# Patient Record
Sex: MALE | Race: WHITE | Employment: OTHER | URBAN - METROPOLITAN AREA
[De-identification: names, ages, dates, MRNs, and addresses within clinical notes are randomized per-mention and may not be internally consistent; named-entity substitution may affect disease eponyms.]

---

## 2017-01-30 ENCOUNTER — TELEPHONE (OUTPATIENT)
Dept: CARDIOTHORACIC SURGERY | Age: 72
End: 2017-01-30

## 2017-01-30 DIAGNOSIS — Z87.09 HX OF PNEUMOTHORAX: ICD-10-CM

## 2017-01-30 DIAGNOSIS — R07.89 OTHER CHEST PAIN: Primary | ICD-10-CM

## 2017-12-21 ENCOUNTER — HOSPITAL ENCOUNTER (OUTPATIENT)
Dept: CARDIAC REHAB | Age: 72
Discharge: HOME | End: 2017-12-31
Attending: FAMILY MEDICINE | Admitting: INTERNAL MEDICINE

## 2018-01-01 ENCOUNTER — HOSPITAL ENCOUNTER (OUTPATIENT)
Dept: OTHER | Age: 73
Discharge: HOME | End: 2018-01-31
Attending: INTERNAL MEDICINE | Admitting: INTERNAL MEDICINE

## 2018-02-01 ENCOUNTER — HOSPITAL ENCOUNTER (OUTPATIENT)
Dept: OTHER | Age: 73
Discharge: HOME | End: 2018-02-28
Attending: INTERNAL MEDICINE | Admitting: INTERNAL MEDICINE

## 2018-03-01 ENCOUNTER — HOSPITAL ENCOUNTER (OUTPATIENT)
Dept: OTHER | Age: 73
Discharge: HOME | End: 2018-03-31
Attending: INTERNAL MEDICINE | Admitting: INTERNAL MEDICINE

## 2021-08-10 ENCOUNTER — APPOINTMENT (RX ONLY)
Dept: URBAN - METROPOLITAN AREA CLINIC 148 | Facility: CLINIC | Age: 76
Setting detail: DERMATOLOGY
End: 2021-08-10

## 2021-08-10 DIAGNOSIS — L82.1 OTHER SEBORRHEIC KERATOSIS: ICD-10-CM

## 2021-08-10 DIAGNOSIS — B07.8 OTHER VIRAL WARTS: ICD-10-CM

## 2021-08-10 DIAGNOSIS — L82.0 INFLAMED SEBORRHEIC KERATOSIS: ICD-10-CM

## 2021-08-10 DIAGNOSIS — D22 MELANOCYTIC NEVI: ICD-10-CM

## 2021-08-10 DIAGNOSIS — L81.4 OTHER MELANIN HYPERPIGMENTATION: ICD-10-CM

## 2021-08-10 DIAGNOSIS — D18.0 HEMANGIOMA: ICD-10-CM

## 2021-08-10 DIAGNOSIS — L91.8 OTHER HYPERTROPHIC DISORDERS OF THE SKIN: ICD-10-CM

## 2021-08-10 DIAGNOSIS — L57.0 ACTINIC KERATOSIS: ICD-10-CM

## 2021-08-10 PROBLEM — D48.5 NEOPLASM OF UNCERTAIN BEHAVIOR OF SKIN: Status: ACTIVE | Noted: 2021-08-10

## 2021-08-10 PROBLEM — D18.01 HEMANGIOMA OF SKIN AND SUBCUTANEOUS TISSUE: Status: ACTIVE | Noted: 2021-08-10

## 2021-08-10 PROCEDURE — ? COUNSELING

## 2021-08-10 PROCEDURE — 17004 DESTROY PREMAL LESIONS 15/>: CPT

## 2021-08-10 PROCEDURE — ? MEDICARE ABN

## 2021-08-10 PROCEDURE — ? ADDITIONAL NOTES

## 2021-08-10 PROCEDURE — ? LIQUID NITROGEN

## 2021-08-10 PROCEDURE — 17110 DESTRUCTION B9 LES UP TO 14: CPT | Mod: 59

## 2021-08-10 PROCEDURE — 11103 TANGNTL BX SKIN EA SEP/ADDL: CPT | Mod: 59

## 2021-08-10 PROCEDURE — ? BIOPSY BY SHAVE METHOD

## 2021-08-10 PROCEDURE — 99203 OFFICE O/P NEW LOW 30 MIN: CPT | Mod: 25

## 2021-08-10 PROCEDURE — 69100 BIOPSY OF EXTERNAL EAR: CPT

## 2021-08-10 PROCEDURE — 11102 TANGNTL BX SKIN SINGLE LES: CPT | Mod: 59

## 2021-08-10 PROCEDURE — ? SUNSCREEN RECOMMENDATIONS

## 2021-08-10 ASSESSMENT — LOCATION SIMPLE DESCRIPTION DERM
LOCATION SIMPLE: NECK
LOCATION SIMPLE: RIGHT CHEEK
LOCATION SIMPLE: RIGHT UPPER ARM
LOCATION SIMPLE: LEFT UPPER ARM
LOCATION SIMPLE: LEFT EAR
LOCATION SIMPLE: LEFT PRETIBIAL REGION
LOCATION SIMPLE: UPPER BACK
LOCATION SIMPLE: RIGHT LOWER BACK
LOCATION SIMPLE: LEFT ANTERIOR NECK
LOCATION SIMPLE: LEFT CHEEK
LOCATION SIMPLE: RIGHT EAR
LOCATION SIMPLE: LEFT CLAVICULAR SKIN
LOCATION SIMPLE: ABDOMEN
LOCATION SIMPLE: RIGHT UPPER BACK

## 2021-08-10 ASSESSMENT — LOCATION DETAILED DESCRIPTION DERM
LOCATION DETAILED: RIGHT SUPERIOR LATERAL UPPER BACK
LOCATION DETAILED: RIGHT SCAPHA
LOCATION DETAILED: LEFT SCAPHA
LOCATION DETAILED: LEFT CLAVICULAR SKIN
LOCATION DETAILED: RIGHT LATERAL MALAR CHEEK
LOCATION DETAILED: RIGHT DISTAL POSTERIOR UPPER ARM
LOCATION DETAILED: SUPERIOR THORACIC SPINE
LOCATION DETAILED: LEFT SUPERIOR LATERAL MALAR CHEEK
LOCATION DETAILED: RIGHT INFERIOR MEDIAL LOWER BACK
LOCATION DETAILED: RIGHT SUPERIOR LATERAL NECK
LOCATION DETAILED: LEFT PROXIMAL PRETIBIAL REGION
LOCATION DETAILED: LEFT SUPERIOR LATERAL NECK
LOCATION DETAILED: LEFT PROXIMAL POSTERIOR UPPER ARM
LOCATION DETAILED: EPIGASTRIC SKIN
LOCATION DETAILED: LEFT SUPERIOR ANTERIOR NECK
LOCATION DETAILED: LEFT CLAVICULAR NECK

## 2021-08-10 ASSESSMENT — LOCATION ZONE DERM
LOCATION ZONE: LEG
LOCATION ZONE: NECK
LOCATION ZONE: EAR
LOCATION ZONE: ARM
LOCATION ZONE: TRUNK
LOCATION ZONE: FACE

## 2021-08-10 NOTE — PROCEDURE: LIQUID NITROGEN
Render In Bullet Format When Appropriate: No
Total Number Of Aks Treated: 44
Detail Level: Zone
Consent: The patient's consent was obtained including but not limited to risks of crusting, scabbing, blistering, scarring, darker or lighter pigmentary change, recurrence, incomplete removal and infection.
Post-Care Instructions: I reviewed with the patient in detail post-care instructions. Patient is to wear sunprotection, and avoid picking at any of the treated lesions. Pt may apply Vaseline to crusted or scabbing areas.
Duration Of Freeze Thaw-Cycle (Seconds): 0
Medical Necessity Clause: This procedure was medically necessary because the lesions that were treated were:
Medical Necessity Information: It is in your best interest to select a reason for this procedure from the list below. All of these items fulfill various CMS LCD requirements except the new and changing color options.
Detail Level: Detailed
Show Topical Anesthesia Variable?: Yes
Medical Necessity Clause: This procedure was medically necessary because the lesions that were treated were: at risk for and/or subject to recurrent physical trauma as a result of lesion type and location with history of the same, bleeding and irritated.
Number Of Freeze-Thaw Cycles: 2 freeze-thaw cycles

## 2022-06-23 ENCOUNTER — APPOINTMENT (RX ONLY)
Dept: URBAN - METROPOLITAN AREA CLINIC 148 | Facility: CLINIC | Age: 77
Setting detail: DERMATOLOGY
End: 2022-06-23

## 2022-06-23 DIAGNOSIS — D22 MELANOCYTIC NEVI: ICD-10-CM

## 2022-06-23 DIAGNOSIS — L57.0 ACTINIC KERATOSIS: ICD-10-CM

## 2022-06-23 DIAGNOSIS — B07.8 OTHER VIRAL WARTS: ICD-10-CM

## 2022-06-23 DIAGNOSIS — L82.1 OTHER SEBORRHEIC KERATOSIS: ICD-10-CM

## 2022-06-23 DIAGNOSIS — D485 NEOPLASM OF UNCERTAIN BEHAVIOR OF SKIN: ICD-10-CM

## 2022-06-23 DIAGNOSIS — L81.4 OTHER MELANIN HYPERPIGMENTATION: ICD-10-CM

## 2022-06-23 DIAGNOSIS — L82.0 INFLAMED SEBORRHEIC KERATOSIS: ICD-10-CM

## 2022-06-23 DIAGNOSIS — D18.0 HEMANGIOMA: ICD-10-CM

## 2022-06-23 PROBLEM — D18.01 HEMANGIOMA OF SKIN AND SUBCUTANEOUS TISSUE: Status: ACTIVE | Noted: 2022-06-23

## 2022-06-23 PROBLEM — D48.5 NEOPLASM OF UNCERTAIN BEHAVIOR OF SKIN: Status: ACTIVE | Noted: 2022-06-23

## 2022-06-23 PROBLEM — D22.5 MELANOCYTIC NEVI OF TRUNK: Status: ACTIVE | Noted: 2022-06-23

## 2022-06-23 PROBLEM — C44.229 SQUAMOUS CELL CARCINOMA OF SKIN OF LEFT EAR AND EXTERNAL AURICULAR CANAL: Status: ACTIVE | Noted: 2022-06-23

## 2022-06-23 PROBLEM — D04.39 CARCINOMA IN SITU OF SKIN OF OTHER PARTS OF FACE: Status: ACTIVE | Noted: 2022-06-23

## 2022-06-23 PROCEDURE — 99213 OFFICE O/P EST LOW 20 MIN: CPT | Mod: 25

## 2022-06-23 PROCEDURE — ? COUNSELING

## 2022-06-23 PROCEDURE — ? BIOPSY BY SHAVE METHOD

## 2022-06-23 PROCEDURE — ? LIQUID NITROGEN

## 2022-06-23 PROCEDURE — 17110 DESTRUCTION B9 LES UP TO 14: CPT | Mod: 59

## 2022-06-23 PROCEDURE — 17004 DESTROY PREMAL LESIONS 15/>: CPT

## 2022-06-23 PROCEDURE — ? DEFER

## 2022-06-23 PROCEDURE — ? PATHOLOGY DISCUSSION

## 2022-06-23 PROCEDURE — 11103 TANGNTL BX SKIN EA SEP/ADDL: CPT | Mod: 59

## 2022-06-23 PROCEDURE — 11102 TANGNTL BX SKIN SINGLE LES: CPT | Mod: 59

## 2022-06-23 PROCEDURE — ? SUNSCREEN RECOMMENDATIONS

## 2022-06-23 ASSESSMENT — LOCATION DETAILED DESCRIPTION DERM
LOCATION DETAILED: RIGHT INFERIOR POSTAURICULAR SKIN
LOCATION DETAILED: LEFT SUPERIOR CENTRAL MALAR CHEEK
LOCATION DETAILED: RIGHT INFERIOR MEDIAL LOWER BACK
LOCATION DETAILED: LEFT INFERIOR POSTAURICULAR SKIN
LOCATION DETAILED: RIGHT ANTERIOR EARLOBE
LOCATION DETAILED: RIGHT RADIAL DORSAL HAND
LOCATION DETAILED: EPIGASTRIC SKIN
LOCATION DETAILED: LEFT INFERIOR CENTRAL MALAR CHEEK
LOCATION DETAILED: RIGHT INFERIOR LATERAL MALAR CHEEK
LOCATION DETAILED: RIGHT CENTRAL POSTAURICULAR SKIN
LOCATION DETAILED: RIGHT LATERAL MALAR CHEEK
LOCATION DETAILED: LEFT LATERAL FOREHEAD
LOCATION DETAILED: LEFT CENTRAL BUCCAL CHEEK
LOCATION DETAILED: SUPERIOR THORACIC SPINE
LOCATION DETAILED: LEFT SUPERIOR LATERAL BUCCAL CHEEK
LOCATION DETAILED: RIGHT INFERIOR HELIX
LOCATION DETAILED: LEFT PROXIMAL DORSAL FOREARM
LOCATION DETAILED: RIGHT ULNAR DORSAL HAND
LOCATION DETAILED: LEFT PROXIMAL POSTERIOR UPPER ARM
LOCATION DETAILED: RIGHT DISTAL POSTERIOR UPPER ARM
LOCATION DETAILED: LEFT POSTERIOR SHOULDER
LOCATION DETAILED: RIGHT CENTRAL MALAR CHEEK
LOCATION DETAILED: RIGHT CENTRAL TEMPLE
LOCATION DETAILED: LEFT CENTRAL POSTAURICULAR SKIN
LOCATION DETAILED: LEFT SUPERIOR FOREHEAD
LOCATION DETAILED: RIGHT SUPERIOR CENTRAL BUCCAL CHEEK
LOCATION DETAILED: LEFT SUPERIOR CENTRAL BUCCAL CHEEK
LOCATION DETAILED: RIGHT SUPERIOR HELIX
LOCATION DETAILED: RIGHT DISTAL DORSAL FOREARM
LOCATION DETAILED: LEFT SUPERIOR MEDIAL MALAR CHEEK
LOCATION DETAILED: LEFT SUPERIOR POSTAURICULAR SKIN
LOCATION DETAILED: MEDIAL FRONTAL SCALP
LOCATION DETAILED: RIGHT SUPERIOR LATERAL BUCCAL CHEEK
LOCATION DETAILED: RIGHT UPPER CUTANEOUS LIP
LOCATION DETAILED: RIGHT MID TEMPLE
LOCATION DETAILED: LEFT INFERIOR MEDIAL UPPER BACK
LOCATION DETAILED: RIGHT INFERIOR LATERAL FOREHEAD

## 2022-06-23 ASSESSMENT — LOCATION ZONE DERM
LOCATION ZONE: TRUNK
LOCATION ZONE: ARM
LOCATION ZONE: HAND
LOCATION ZONE: LIP
LOCATION ZONE: SCALP
LOCATION ZONE: FACE
LOCATION ZONE: EAR

## 2022-06-23 ASSESSMENT — LOCATION SIMPLE DESCRIPTION DERM
LOCATION SIMPLE: RIGHT UPPER ARM
LOCATION SIMPLE: LEFT FOREARM
LOCATION SIMPLE: LEFT UPPER ARM
LOCATION SIMPLE: RIGHT EAR
LOCATION SIMPLE: LEFT CHEEK
LOCATION SIMPLE: LEFT FOREHEAD
LOCATION SIMPLE: RIGHT FOREARM
LOCATION SIMPLE: RIGHT TEMPLE
LOCATION SIMPLE: ABDOMEN
LOCATION SIMPLE: RIGHT CHEEK
LOCATION SIMPLE: RIGHT FOREHEAD
LOCATION SIMPLE: SCALP
LOCATION SIMPLE: LEFT SHOULDER
LOCATION SIMPLE: LEFT UPPER BACK
LOCATION SIMPLE: RIGHT LOWER BACK
LOCATION SIMPLE: RIGHT LIP
LOCATION SIMPLE: FRONTAL SCALP
LOCATION SIMPLE: RIGHT HAND
LOCATION SIMPLE: UPPER BACK

## 2022-06-23 NOTE — PROCEDURE: DEFER
Scheduling Instructions (Optional): Dr. Hollis
Procedure To Be Performed At Next Visit: Mohs surgery
Detail Level: Detailed
Introduction Text (Please End With A Colon): The following procedure was deferred:
Scheduling Instructions (Optional): Dr. Hollis
Scheduling Instructions (Optional): excision

## 2022-06-23 NOTE — PROCEDURE: LIQUID NITROGEN
Duration Of Freeze Thaw-Cycle (Seconds): 3
Consent: The patient's consent was obtained including but not limited to risks of crusting, scabbing, blistering, scarring, darker or lighter pigmentary change, recurrence, incomplete removal and infection.
Post-Care Instructions: I reviewed with the patient in detail post-care instructions. Patient is to wear sunprotection, and avoid picking at any of the treated lesions. Pt may apply Vaseline to crusted or scabbing areas.
Number Of Freeze-Thaw Cycles: 2 freeze-thaw cycles
Show Aperture Variable?: Yes
Render Post-Care Instructions In Note?: no
Detail Level: Zone
Medical Necessity Clause: This procedure was medically necessary because the lesions that were treated were: at risk for and/or subject to recurrent physical trauma as a result of lesion type and location with history of the same, bleeding and irritated.
Spray Paint Text: The liquid nitrogen was applied to the skin utilizing a spray paint frosting technique.
Detail Level: Detailed
Medical Necessity Information: It is in your best interest to select a reason for this procedure from the list below. All of these items fulfill various CMS LCD requirements except the new and changing color options.

## 2022-06-28 ENCOUNTER — APPOINTMENT (RX ONLY)
Dept: URBAN - METROPOLITAN AREA CLINIC 148 | Facility: CLINIC | Age: 77
Setting detail: DERMATOLOGY
End: 2022-06-28

## 2022-06-28 DIAGNOSIS — D22 MELANOCYTIC NEVI: ICD-10-CM

## 2022-06-28 PROBLEM — D22.5 MELANOCYTIC NEVI OF TRUNK: Status: ACTIVE | Noted: 2022-06-28

## 2022-06-28 PROCEDURE — 11402 EXC TR-EXT B9+MARG 1.1-2 CM: CPT | Mod: 79

## 2022-06-28 PROCEDURE — ? EXCISION

## 2022-06-28 PROCEDURE — 13101 CMPLX RPR TRUNK 2.6-7.5 CM: CPT | Mod: 79

## 2022-06-28 ASSESSMENT — LOCATION ZONE DERM: LOCATION ZONE: TRUNK

## 2022-06-28 ASSESSMENT — LOCATION SIMPLE DESCRIPTION DERM: LOCATION SIMPLE: RIGHT LOWER BACK

## 2022-06-28 ASSESSMENT — LOCATION DETAILED DESCRIPTION DERM: LOCATION DETAILED: RIGHT INFERIOR MEDIAL LOWER BACK

## 2022-06-28 NOTE — PROCEDURE: EXCISION
Called patient, and informed him that the blood pressure medication he was requesting was being handled by his cardiologist. Patient informed that he needed to call the cardiologist to get the medications that he requested. Patient stated that he was out of medication Patient stated that Dr. Ifeanyi Vuong retired. I let him know that they should still be able to help him. Offered appointment today with Cheryl Wyatt to get refills, but patient declined because he wamted to only see Dr. Babar Duarte. I offered Dr. Babar Duarte' next appointment of 1/04, but patient declined.  Patient stated that he would call his cardiologist. Consent was obtained from the patient. The risks and benefits to therapy were discussed in detail. Specifically, the risks of infection, scarring, bleeding, prolonged wound healing, incomplete removal, allergy to anesthesia, nerve injury and recurrence were addressed. Prior to the procedure, the treatment site was clearly identified and confirmed by the patient. All components of Universal Protocol/PAUSE Rule completed.

## 2022-07-12 ENCOUNTER — APPOINTMENT (RX ONLY)
Dept: URBAN - METROPOLITAN AREA CLINIC 148 | Facility: CLINIC | Age: 77
Setting detail: DERMATOLOGY
End: 2022-07-12

## 2022-07-12 DIAGNOSIS — Z48.02 ENCOUNTER FOR REMOVAL OF SUTURES: ICD-10-CM

## 2022-07-12 PROCEDURE — 99024 POSTOP FOLLOW-UP VISIT: CPT

## 2022-07-12 PROCEDURE — ? SUTURE REMOVAL (GLOBAL PERIOD)

## 2022-07-12 ASSESSMENT — LOCATION SIMPLE DESCRIPTION DERM: LOCATION SIMPLE: RIGHT LOWER BACK

## 2022-07-12 ASSESSMENT — LOCATION DETAILED DESCRIPTION DERM: LOCATION DETAILED: RIGHT INFERIOR MEDIAL LOWER BACK

## 2022-07-12 ASSESSMENT — LOCATION ZONE DERM: LOCATION ZONE: TRUNK

## 2022-07-12 NOTE — PROCEDURE: SUTURE REMOVAL (GLOBAL PERIOD)
Detail Level: Detailed
Add 31436 Cpt? (Important Note: In 2017 The Use Of 46335 Is Being Tracked By Cms To Determine Future Global Period Reimbursement For Global Periods): yes

## 2023-04-21 ENCOUNTER — APPOINTMENT (RX ONLY)
Dept: URBAN - METROPOLITAN AREA CLINIC 148 | Facility: CLINIC | Age: 78
Setting detail: DERMATOLOGY
End: 2023-04-21

## 2023-04-21 DIAGNOSIS — L57.0 ACTINIC KERATOSIS: ICD-10-CM

## 2023-04-21 PROBLEM — C44.319 BASAL CELL CARCINOMA OF SKIN OF OTHER PARTS OF FACE: Status: ACTIVE | Noted: 2023-04-21

## 2023-04-21 PROBLEM — C44.01 BASAL CELL CARCINOMA OF SKIN OF LIP: Status: ACTIVE | Noted: 2023-04-21

## 2023-04-21 PROBLEM — D48.5 NEOPLASM OF UNCERTAIN BEHAVIOR OF SKIN: Status: ACTIVE | Noted: 2023-04-21

## 2023-04-21 PROCEDURE — ? DEFER

## 2023-04-21 PROCEDURE — 99213 OFFICE O/P EST LOW 20 MIN: CPT | Mod: 25

## 2023-04-21 PROCEDURE — 69100 BIOPSY OF EXTERNAL EAR: CPT | Mod: 59

## 2023-04-21 PROCEDURE — ? LIQUID NITROGEN

## 2023-04-21 PROCEDURE — 17000 DESTRUCT PREMALG LESION: CPT

## 2023-04-21 PROCEDURE — ? PATHOLOGY DISCUSSION

## 2023-04-21 PROCEDURE — ? BIOPSY BY SHAVE METHOD

## 2023-04-21 PROCEDURE — ? COUNSELING

## 2023-04-21 PROCEDURE — 17003 DESTRUCT PREMALG LES 2-14: CPT

## 2023-04-21 ASSESSMENT — LOCATION SIMPLE DESCRIPTION DERM
LOCATION SIMPLE: RIGHT TEMPLE
LOCATION SIMPLE: LEFT ZYGOMA
LOCATION SIMPLE: LEFT CHEEK
LOCATION SIMPLE: LEFT FOREHEAD
LOCATION SIMPLE: RIGHT CHEEK

## 2023-04-21 ASSESSMENT — LOCATION DETAILED DESCRIPTION DERM
LOCATION DETAILED: LEFT FOREHEAD
LOCATION DETAILED: RIGHT CENTRAL TEMPLE
LOCATION DETAILED: RIGHT CENTRAL BUCCAL CHEEK
LOCATION DETAILED: LEFT LATERAL MALAR CHEEK
LOCATION DETAILED: LEFT SUPERIOR CENTRAL MALAR CHEEK
LOCATION DETAILED: LEFT CENTRAL ZYGOMA

## 2023-04-21 ASSESSMENT — LOCATION ZONE DERM: LOCATION ZONE: FACE

## 2023-04-21 NOTE — PROCEDURE: LIQUID NITROGEN
Detail Level: Detailed
Render Post-Care Instructions In Note?: yes
Consent: The patient's consent was obtained including but not limited to risks of crusting, scabbing, blistering, scarring, darker or lighter pigmentary change, recurrence, incomplete removal and infection.
Duration Of Freeze Thaw-Cycle (Seconds): 0
Number Of Freeze-Thaw Cycles: 2 freeze-thaw cycles
Post-Care Instructions: I reviewed with the patient in detail post-care instructions. Patient is to wear sunprotection, and avoid picking at any of the treated lesions. Pt may apply Vaseline to crusted or scabbing areas.
Render Note In Bullet Format When Appropriate: No
Detail Level: Zone

## 2023-04-21 NOTE — PROCEDURE: DEFER
X Size Of Lesion In Cm (Optional): 0
Introduction Text (Please End With A Colon): Procedure to defer today:
Detail Level: Detailed
Procedure To Be Performed At Next Visit: Mohs surgery

## 2023-05-16 ENCOUNTER — APPOINTMENT (RX ONLY)
Dept: URBAN - METROPOLITAN AREA CLINIC 148 | Facility: CLINIC | Age: 78
Setting detail: DERMATOLOGY
End: 2023-05-16

## 2023-05-16 PROBLEM — C44.319 BASAL CELL CARCINOMA OF SKIN OF OTHER PARTS OF FACE: Status: ACTIVE | Noted: 2023-05-16

## 2023-05-16 PROCEDURE — 13132 CMPLX RPR F/C/C/M/N/AX/G/H/F: CPT

## 2023-05-16 PROCEDURE — ? MOHS SURGERY

## 2023-05-16 PROCEDURE — 17312 MOHS ADDL STAGE: CPT

## 2023-05-16 PROCEDURE — 17311 MOHS 1 STAGE H/N/HF/G: CPT

## 2023-05-16 PROCEDURE — 13133 CMPLX RPR F/C/C/M/N/AX/G/H/F: CPT

## 2023-05-16 NOTE — PROCEDURE: MOHS SURGERY
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

## 2023-05-16 NOTE — PROCEDURE: MOHS SURGERY
Stage 2 Override Histology Text: Atypical basaloid cells in the dermis and epidermis DX Nodular and Superficial BCC

## 2023-05-16 NOTE — PROCEDURE: MOHS SURGERY
sensation is normal and strength is normal. +left arm sensation intact full ROM Burow's Graft Text: The defect edges were debeveled with a #15 scalpel blade. Given the location of the defect, shape of the defect, the proximity to free margins and the presence of a standing cone deformity a Burow's skin graft was deemed most appropriate. The standing cone was removed and this tissue was then trimmed to the shape of the primary defect. The adipose tissue was also removed until only dermis and epidermis were left.  The skin margins of the secondary defect were undermined to an appropriate distance in all directions utilizing iris scissors.  The secondary defect was closed with interrupted buried subcutaneous sutures.  The skin edges were then re-apposed with running  sutures.  The skin graft was then placed in the primary defect and oriented appropriately.

## 2023-05-23 ENCOUNTER — APPOINTMENT (RX ONLY)
Dept: URBAN - METROPOLITAN AREA CLINIC 148 | Facility: CLINIC | Age: 78
Setting detail: DERMATOLOGY
End: 2023-05-23

## 2023-05-23 DIAGNOSIS — Z48.02 ENCOUNTER FOR REMOVAL OF SUTURES: ICD-10-CM

## 2023-05-23 PROBLEM — C44.229 SQUAMOUS CELL CARCINOMA OF SKIN OF LEFT EAR AND EXTERNAL AURICULAR CANAL: Status: ACTIVE | Noted: 2023-05-23

## 2023-05-23 PROCEDURE — 99024 POSTOP FOLLOW-UP VISIT: CPT

## 2023-05-23 PROCEDURE — ? MOHS SURGERY

## 2023-05-23 PROCEDURE — 17312 MOHS ADDL STAGE: CPT | Mod: 79

## 2023-05-23 PROCEDURE — ? SUTURE REMOVAL (GLOBAL PERIOD)

## 2023-05-23 PROCEDURE — 17311 MOHS 1 STAGE H/N/HF/G: CPT | Mod: 79

## 2023-05-23 PROCEDURE — ? PRESCRIPTION

## 2023-05-23 RX ORDER — GENTAMICIN SULFATE 1 MG/G
OINTMENT TOPICAL
Qty: 30 | Refills: 1 | Status: ERX | COMMUNITY
Start: 2023-05-23

## 2023-05-23 RX ADMIN — GENTAMICIN SULFATE: 1 OINTMENT TOPICAL at 00:00

## 2023-05-23 ASSESSMENT — LOCATION SIMPLE DESCRIPTION DERM: LOCATION SIMPLE: LEFT CHEEK

## 2023-05-23 ASSESSMENT — LOCATION ZONE DERM: LOCATION ZONE: FACE

## 2023-05-23 ASSESSMENT — LOCATION DETAILED DESCRIPTION DERM: LOCATION DETAILED: LEFT SUPERIOR CENTRAL BUCCAL CHEEK

## 2023-05-23 NOTE — PROCEDURE: MOHS SURGERY
Spiritual Plan of Care    Pt Name: Mary Graham  Pt : 1947  Date: 2021    Referral source: Interdisciplinary team  Reason for visit: Routine Visit  Visited with: Patient, Family/Friend  Family/Friend Assessment: Anxious, Support system  Family/Friend  Intervention:  Support, Empathic Listening, Prayer  Spiritual Plan of Care: Follow up if requested  Patient Reported Outcome:  Coping techniques strengthened  Time Spent: 35 minutes in person    Spiritual/Emotional Assessment: Pt's family members described her in glowing terms and the hope they had for healing. After doctor visit, family changed their request to a \"miracle,\" and asked  to pray over pt.    Outcome:   provided emotional support and prayer. Family expressed gratitude for visit.    Recommendations: Additional  services available prn     Star Wedge Flap Text: The defect edges were debeveled with a #15 scalpel blade.  Given the location of the defect, shape of the defect and the proximity to free margins a star wedge flap was deemed most appropriate.  Using a sterile surgical marker, an appropriate rotation flap was drawn incorporating the defect and placing the expected incisions within the relaxed skin tension lines where possible. The area thus outlined was incised deep to adipose tissue with a #15 scalpel blade.  The skin margins were undermined to an appropriate distance in all directions utilizing iris scissors.

## 2023-05-23 NOTE — PROCEDURE: SUTURE REMOVAL (GLOBAL PERIOD)
Detail Level: Detailed
Add 01382 Cpt? (Important Note: In 2017 The Use Of 90777 Is Being Tracked By Cms To Determine Future Global Period Reimbursement For Global Periods): yes

## 2023-10-19 ENCOUNTER — APPOINTMENT (RX ONLY)
Dept: URBAN - METROPOLITAN AREA CLINIC 148 | Facility: CLINIC | Age: 78
Setting detail: DERMATOLOGY
End: 2023-10-19

## 2023-10-19 DIAGNOSIS — G51.0 BELL'S PALSY: ICD-10-CM

## 2023-10-19 PROCEDURE — 99212 OFFICE O/P EST SF 10 MIN: CPT

## 2023-10-19 PROCEDURE — ? COUNSELING

## 2023-10-19 PROCEDURE — ? RECOMMENDATIONS

## 2023-10-19 PROCEDURE — ? ADDITIONAL NOTES

## 2023-10-19 ASSESSMENT — LOCATION ZONE DERM: LOCATION ZONE: FACE

## 2023-10-19 ASSESSMENT — LOCATION DETAILED DESCRIPTION DERM: LOCATION DETAILED: LEFT INFERIOR CENTRAL MALAR CHEEK

## 2023-10-19 ASSESSMENT — LOCATION SIMPLE DESCRIPTION DERM: LOCATION SIMPLE: LEFT CHEEK

## 2023-10-19 NOTE — PROCEDURE: RECOMMENDATIONS
Detail Level: Zone
Render Risk Assessment In Note?: no
Recommendations (Free Text): Recommended to see Dr. Bob, his PCP, as well. Pt states Dr. Bob told him it can take several months to resolve. Spoke personally  with Dr. Bob and explained to him patient history and will refer to neurology and ophthalmology since pt is [facility].

## 2023-10-19 NOTE — PROCEDURE: ADDITIONAL NOTES
Render Risk Assessment In Note?: yes
Additional Notes: Patient can not close his left eye, eye valentine constantly and can’t smile, left side of mouth droops.\\nPatient stated that it happen two weeks after surgery and pt never had a follow up after this surgery except for one week after where he had another MOHS surgery to the left ear. Pt was also diagnosed with lung cancer at the same time so has been seeing oncology over the last few months.
Detail Level: Zone

## 2023-10-19 NOTE — HPI: OTHER
Condition:: Possible Bell’s palsy
Please Describe Your Condition:: is an established patient who is being seen for a chief complaint of Possible Bell’s palsy. Left side of face is drooping and feels numb along with he is not able to completely close the left eye. Patient had MOHS surgery on May, 16, 2023 and  states the drooping and the inability to close the left eye started about two weeks after surgery. Pt was to have a follow up appt but never did due to patient also being diagnosed with lung cancer during the same time period. Pts wife has pics with her on her phone. Patient  states not only does his left side feels numb  it his eye is watering and hurts with light so much it is hard for him to drive. He denies any cold sxs, no fever or chills and no headache or dizziness. No arm or jaw pain and unusual shortness of breath.

## 2024-01-22 NOTE — PROCEDURE: EXCISION
Cardiac Rehabilitation Home Exercise Prescription    Exercise Plan:  It is very beneficial for you to exercise at home on a regular basis.  The American [employer] Association suggests at least 150 minutes per week of moderate exercise.  If you are interested in losing weight, 300 minutes or more per week of moderate exercise may be necessary.  Below is a list of guidelines for you to follow when exercising.     Warm-Up:  The exercise session should always begin with at least 3-5  minutes of warming up.  This means starting out the aerobic exercise phase at a slow pace, gradually increasing the speed or intensity.     Aerobic Exercise:  This phase consists of continuous, purposeful activity using larger muscle groups to elevate heart rate to a goal range. In order to achieve the benefits of aerobic exercise, these activities must be sustained for at least 10 minutes at a time. If 10 minutes is still too difficult, reduce your intensity of exercise, or take breaks until you are able to achieve 10 or more minutes at a time.    Modes of Exercise:   This is simply the type of exercise that you choose to do. There are many different modes of exercise that can be beneficial. Walking, Treadmill, Stationary Bike, Swimming, and Elliptical are all different modes of exercise. Some of these forms of exercise require less exertion, while others can be more demanding so be sure that you find a mode of exercise that work best for you.   Your Suggested Modes of Exercise: Treadmill, Recumbent bike, Upright bike, Free weights, Walking      Frequency of Exercise:   You should aim to exercise for 5 days out of the week. If you are new to exercise then you should start with just the 2-3 days of cardiac rehab exercise. As you get stronger, try adding in one extra day per week until you have reached 5 days a week with consistent exercise. If you have a busy schedule, you may be able to adjust the frequency of exercise by increasing the  duration of exercise.  Your Suggested Frequency of Exercise: 5-7 days/week    Duration of Aerobic Exercise:   This is the total amount of time that you are exercising. First try to exercise for 10 minutes uninterrupted, then increase the length of each session by 5-10 minutes as tolerated. Try to build up to 30 minute sessions, increasing the duration of exercise can have additional benefit, just make sure you don't over exert yourself. Make sure you are not increasing multiple factors of exercise at once. For example, if I went from 4 days per week to 5 days per week of exercise, I would not increase my duration of exercise during that week.  Your Suggested Duration of Exercise: 31-45 minutes    Intensity of Exercise:   Intensity of exercise can be prescribed by using a couple of different methods. Some of the primary measures of intensity are: modified RPE scale, Target Heart Rate, MET level, %1RM.    The modified RPE scale is a way of rating your perceived level of exertion into a 0-10 number scale. Most exercise should be done in the range of moderate to somewhat hard, but everyone is unique and should talk to their nurse or exercise physiologist before exercise.      Rating Description   0 Nothing/Resting   1 Very Easy   2 Easy   3 Moderate   4 Somewhat Hard   5 Hard   6    7 Very Hard   8    9    10 Maximal Effort/Very Very Hard     Target Heart Rate Range:  This is a specific range your heart rate should be in for the majority of your exercise. We typically encourage 30-50 beats per minute above your resting heart rate as a good place to start exercising. This may need to be individualized based off of your health history and goals. Your medications can have a significant effect on your heart rate and how it responds during exercise. Certain medications, such as beta-blockers can blunt your heart rate response during exercise, meaning that even though you might be at a moderate or somewhat hard intensity,  your heart rate may not reach the target heart rate range.     Finding your resting heart rate can be done in a few different ways. If you have a smart watch or heart rate monitor, these can be used to find your resting heart rate most times. If you do not have access to these devices, you can always ask your nurse or exercise physiologist what your average resting heart rate has been.     Lastly, the most simple way involves finding your pulse, and counting over a period of time. Start by placing your palm upward toward the ceiling, then place two fingers on the thumb side of your wrist. Apply gentle pressure and feel for a pulse. Once you have found your pulse, we need to use a stopwatch or a clock. You can count for a period of 60 seconds, but can also count over an interval that can multiply into 60.     For example, I can count how many pulses I feel over 10 seconds, then I will multiply that number by 6. If I felt 10 pulses, then my heart rate would be 60 beats per minute.    Your Target HR Range & Resting Heart Rate: 30-50 beats/min above your resting heart rate    Your Resting HR Target Heart Rate Range   ~60 bpm  bpm   ~70 bpm 100-120 bpm   ~80 bpm 110-130 bpm   ~90 bpm 120-140 bpm   **only use this as a guide, follow any instruction given by your MD or cardiac rehab therapist    MET Level:  Using MET levels is a great way to quantify exercise intensity and be able to gauge and progress your exercise capacity. The term \"MET\" stands for metabolic equivalent. In simple terms, its the amount of energy required from your body to do an activity. Our bodies constantly require energy, therefore even when we are at rest, we are at a MET level of 1. As the intensity of exercise or activity increases, so does the MET level. We can use MET levels to slowly increase the intensity of exercise on a treadmill. Here is a table of MET levels across varying speeds and inclines.    Your Current MET level: 5-7  METs    MET Level Chart for Treadmill Walking    Grade/Incline 0% 2% 4% 6% 8% 10%   Speed          1.0 mph  1.8 2.0 2.3 2.6 2.9 3.1   1.5 mph  2.1 2.6 3.0 3.4 3.8 4.2   2.0 mph  2.5 3.1 3.6 4.2 4.7 5.3   2.5 mph  2.9 3.6 4.3 5.0 5.7 6.4   3.0 mph  3.3 4.1 5.0 5.8 6.6 7.4   3.5 mph  3.7 4.6 5.6 6.6 7.5 8.5   4.0 mph  4.9 6.0 7.1 8.2 9.3 10.4     Strength:   Strength training or resistance training is a great addition to your exercise routine and should be included regularly. Strength training should be done 2-3 times a week and can be paired with your aerobic training. It is important to make sure you are not strength training consecutive days of the week. It is important to give your muscles at least 24 hours of rest in between strength training sessions, depending on intensity, sometimes up to 48 hours. Make sure you start with very light weight to get comfortable and use to the exercises you are doing. It is also important to start with lighter weights because you may get sore very easily at first, but this should get better the more frequently you are training.     Strength training should be done for 8-12 repetitions in sets of 2 or 3. Ensure that you are taking a break of 30-90 seconds in between each set depending on the intensity of the exercise. Incorporate large muscle groups such as your legs, back, chest, and shoulders. Use a table, like one found at the end of this handout, to plan out your exercises and track how many sets and repetitions you did. Listen to your body and don't do an exercise if it hurts, also follow any lifting restrictions given by your doctor or surgeon.     You can use the chart provided at the end to help plan your strength training sessions, here are some examples of exercises that could be included in this plan. Ask your Exercise physiologist or nurse for help if you are having trouble planning your strength training sessions.    List of Exercises: Squat, Deadlift, Lunge, Chest  Press, Row, Shoulder Press, Pull-down, Tricep extension, Bicep curl, Leg extension, leg curl, Plank, Crunch, Russian twist, etc.    Progression:   This principle of exercise is important because of the way that our bodies respond to exercise. Our bodies change and adapt to the stresses that we impose upon them. That being said, in order to continue to strengthen our muscles and build our endurance we have to make the exercise more challenging over time. This can be done in multiple fashions, some of which are listed below.    1. Frequency: Increase the number of days per week you exercise  2. Intensity: Increase the MET level or weight of the exercise you are doing  3. Time: Increase the amount of total time per session  4. Type: Try doing a different kind of exercise    This is known as the F.I.T.T. principle and it is a great way to ensure your exercise is being progressed appropriately. It is important to make these progressions slowly, and one at a time. You want to avoid any rapid changes or fluctuations in these variables to help prevent injury. If you're feeling stuck in your exercise program, feel free to reach out to a nurse or exercise physiologist to help create a plan to progress your exercise.    Cool-Down:  The exercise session should always end with at least 3-5  minutes of cooling down. This means ending the aerobic exercise at a slow pace, gradually decreasing speed or intensity.  This should be followed by stretching to increase flexibility.  Hold each stretch for 15-30 seconds.     Always listen to your body!    During exercise, if you experience any of the following Signs and Symptoms STOP exercising and alert your physician:  Chest discomfort (that may spread to other areas of the body), lightheadedness/dizziness, unusual shortness of breath, unusual fatigue, excessive sweating, or nausea/vomiting.     Special Weather Considerations:  Avoid exercise outdoors if the temperature is less than 20  degrees or more than 80 degrees.  Remember to factor in wind chills and heat indexes. Also, stay hydrated and drink water.    Cardiac Rehabilitation Life Style Modification Support    Tips to help you succeed with Lifestyle Change:  Take it one change at a time:  Do not tackle all your goals at once. Rank your risk factors and then decided which to change first.  Set realistic goals-set your long term goals and divide each long term goal into a short term goal.  Expect set-backs:  Make a plan to get back on track. A journal or log may help you stick to your plan.     Heart Healthy Diet Tips:  Eat a variety of foods every day. Include choices from all of the food groups.  Eat high-fiber foods such as fresh fruits, fresh vegetables, and whole grains.  Choose low-fat or reduced fat products and lean cut meats.   Control your portions and make healthy choices more often.  Read food labels and checking for serving size.  Try not to skip meals.  This may cause you to overeat at the next meal.      Example Food Journal    Here is an example diet of about 1,800 calories a day. Your caloric needs may be more or less than what is shown below, please consult a healthcare provider if you have questions. Below are the U.[place]. Department of Agriculture (USDA) guidelines for daily recommended amount of each food group based on 1,800 calories per day.      Vegetables  2½ cups Fruits  1½ cups Grains  6 ounces Dairy  3 cups Protein  5 ounces   Eat a variety of vegetables each day.  Aim for these amounts each week:  1½ cups dark green vegetables  5½ cups red or orange-colored vegetables  1½ cups dry beans and peas  5 cups starchy vegetables  4 cups other vegetables Eat a variety of fruits each day.  Go easy on fruit juices.  Good choices of fruits include:  Berries  Bananas  Apples  Melon  Dry fruit  Frozen fruit  Canned fruit Choose whole grains whenever you can.  Aim to eat at least 3 ounces of whole grains each  day:  Bread  Cereal  Rice  Pasta  Potatoes  Tortillas Choose low-fat or fat-free milk, yogurt, or cheese each day.  Good choices include:  Low-fat or fat-free milk or chocolate milk  Low-fat or fat-free yogurt  Low-fat or fat-free cottage cheese or other reduced-fat cheeses  Calcium-fortified milk alternatives Choose low-fat or lean meats, poultry, fish and seafood each day.  Vary your protein. Choose more:  Fish and other seafood  Lean low-fat meat and poultry  Eggs  Beans, peas  Tofu  Unsalted nuts and seeds  Choose less high-fat and red meat.   Source: USDA MyPlate, www.choosemyplate.gov    Know your limits on oils (fats) and sugars  Your allowance for oils is 24 grams or about 5 teaspoons a day (oil includes vegetable oil, mayonnaise, soft margarine, salad dressing, nuts, olives, avocados, and some fish).  Limit the extras (solid fats and sugars, also called \"empty calories\") to 130 calories a day.  Cut back on salt (sodium). Stay under 2,300 mg sodium a day. If you have a health condition such as heart disease or high blood pressure, your doctor will likely tell you to limit sodium to no more than 1,500 mg a day.    MyPlate servings worksheet: 1,800 calories  This worksheet tells you how many servings you should get each day from each food group, and tells you how much food makes a serving. Use this as a guide as you plan your meals throughout the day. Track your progress daily by writing in what you actually ate.  Food group  Daily MyPlate goal  What you ate today    Vegetables 5 half-cups or 5 servings  One serving is:  ½ cup cut-up raw or cooked vegetables  1 cup raw, leafy vegetables  ½ baked sweet potato  ½ cup vegetable juice  Note: At meals, fill half your plate with vegetables and fruit.     Fruits 3 half-cups or 3 servings  One serving is:  ½ cup fresh, frozen, or canned fruit  1 medium piece of fruit  1 cup of berries or melon  ½ cup dried fruit  ½ cup 100% fruit juice  Note: Make most choices fruit  instead of juice.     Grains 6 servings or 6 ounces  One serving is:  1 slice bread  1 cup dry cereal  ½ cup cooked rice, pasta, or cereal  1 5-inch tortilla  Note: Choose whole grains for at least half of your servings each day.     Dairy 3 servings or 3 cups  One serving is:  1 cup milk  1½ ounces reduced-fat hard cheese  2 ounces processed cheese  1 cup low-fat yogurt  1/3 cup shredded cheese  Note: Choose low-fat or fat-free most often.     Protein 5 servings or 5 ounces  One serving is:  1 ounce cooked lean beef, pork, lamb, or ham  1 ounce cooked chicken or turkey (no skin)  1 ounce cooked fish or shellfish (not fried)  1 egg  ¼ cup egg substitute  ½ ounce nuts or seeds  1 tablespoon peanut or almond butter  ¼ cup cooked dry beans or peas  ½ cup tofu  2 tablespoons hummus     Date Last Reviewed: 7/1/2017 © 2000-2018 [employer]. [address] [place] [address]. All rights reserved. This information is not intended as a substitute for professional medical care. Always follow your healthcare professional's instructions.      Weekly Workout Planner   Monday Tuesday Wednesday Thursday Friday Saturday Sunday   Walking/Aerobic          Strength/Weights          Flexibility/Other          *Aerobic exercise should be done 3-5 times a week for a total of 150 minutes  *Strength training should be done 2-3 times a week on non-consecutive days  *Flexibility training should be done after each session or a minimum of 2-3 times per week.      Exercise Reps Sets Weight RPE   1.       2.       3.       4.       5.       6.           Weekly Goals (S.M.A.R.T.) (Is it Specific?  Is it Measurable? Is it achievable? Is it Relevant? Is it Time-bound?)    ______________________________________________________      ______________________________________________________      ______________________________________________________              Notes/Comments/Ideas:             Peng Advancement Flap Text: The defect edges were debeveled with a #15 scalpel blade.  Given the location of the defect, shape of the defect and the proximity to free margins a Peng advancement flap was deemed most appropriate.  Using a sterile surgical marker, an appropriate advancement flap was drawn incorporating the defect and placing the expected incisions within the relaxed skin tension lines where possible. The area thus outlined was incised deep to adipose tissue with a #15 scalpel blade.  The skin margins were undermined to an appropriate distance in all directions utilizing iris scissors.